# Patient Record
(demographics unavailable — no encounter records)

---

## 2024-12-12 NOTE — PHYSICAL EXAM
[Well Developed] : well developed [NAD] : in no acute distress [Moist & Pink Mucous Membranes] : moist and pink mucous membranes [CTAB] : lungs clear to auscultation bilaterally [Regular Rate and Rhythm] : regular rate and rhythm [Normal S1, S2] : normal S1 and S2 [Soft] : soft  [Distended] : distended [Normal Bowel Sounds] : normal bowel sounds [Stool Palpable] : stool palpable [No HSM] : no hepatosplenomegaly appreciated [Normal Tone] : normal tone [Well-Perfused] : well-perfused [Interactive] : interactive [icteric] : anicteric [Respiratory Distress] : no respiratory distress  [Tender] : non tender [Edema] : no edema [Cyanosis] : no cyanosis [Rash] : no rash [Jaundice] : no jaundice [de-identified] : mildly tender to palpation in RLQ and LLQ

## 2024-12-12 NOTE — ASSESSMENT
[Remission] : Remission [FreeTextEntry5] : PUCAI 0 [FreeTextEntry1] : Sharon is being treated for Ulcerative Colitis, and has had improvement on infliximab. Her weight and appetite have improved recently. To reassess her bowel inflammation, I asked for her to send stool calprotectin, that previously showed persistent elevated inflammation. The following was recommended: - continue infliximab to 600 mg every 4-6 weeks, may need further adjustments - send stool calprotectin - return to office for follow up in 3-4 months - Family should call sooner if clinically indicated.  Mom was reassured and satisfied with the plan.

## 2024-12-12 NOTE — HISTORY OF PRESENT ILLNESS
[Ulcerative Colitis] : Ulcerative Colitis [Colon] : colon [No Pain] : no pain =  score of  ( 0 ) [None] : none =  score of  ( 0 ) [Formed] : formed = score of ( 0 ) [0 - 2] : 0 - 2 stools = score of ( 0 ) [No] : no =  score of  ( 0 ) [No Limitation of Activity] : no limitation of activity =  score of  ( 0 )  [Perianal Disease] : The patient does not have perianal disease. 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 Transverse colon to cecum with loss of normal vasculature and erythema. Normal terminal ileum.  charles 3 colitis rectum to descending, charles 2 colitis transverse to cecum.pancolitis, normal TI, path: pancolonic active colitis with foci of cryptitis, focal mucin depletion with reactive epithelial changes, and  lamina propria plasmacytosis with scattered neutrophils, normal TI

## 2024-12-12 NOTE — CONSULT LETTER
[Dear  ___] : Dear  [unfilled], [Consult Letter:] : I had the pleasure of evaluating your patient, [unfilled]. [Please see my note below.] : Please see my note below. [Consult Closing:] : Thank you very much for allowing me to participate in the care of this patient.  If you have any questions, please do not hesitate to contact me. [Sincerely,] : Sincerely, [FreeTextEntry3] : Pal Peña MD MSc  Director, Pediatric Endoscopy Pediatric Gastroenterology and Nutrition [facility] [employer] at [facility] and Lou Aguero [facility]  Division of Pediatric Gastroenterology and Nutrition  [address], [address]  [place] [address]  [phone]

## 2025-03-06 NOTE — ASSESSMENT
[Remission] : Remission [FreeTextEntry5] : PUCAI 0 [FreeTextEntry1] : Sharon is being treated for Ulcerative Colitis, and has had improvement on infliximab. Her weight and appetite have improved recently. Stool calprotectin still elevated but could be from rectal inflammation that has persisted as shown on her recent colonoscopy. he following was recommended: - continue infliximab to 600 mg every 4-6 weeks, may need further adjustments - send stool calprotectin periodically - return to office for follow up in 3-4 months - Family should call sooner if clinically indicated.  Mom was reassured and satisfied with the plan.

## 2025-03-06 NOTE — HISTORY OF PRESENT ILLNESS
[Ulcerative Colitis] : Ulcerative Colitis [Colon] : colon [No Pain] : no pain =  score of  ( 0 ) [None] : none =  score of  ( 0 ) [Formed] : formed = score of ( 0 ) [0 - 2] : 0 - 2 stools = score of ( 0 ) [No] : no =  score of  ( 0 ) [No Limitation of Activity] : no limitation of activity =  score of  ( 0 )  [Perianal Disease] : The patient does not have perianal disease. [de-identified] : Sept 2023 [de-identified] : Calprotectin: 12/12/24: 628 7/2/24 382 3/8/24 377 8/18/23 1320 [de-identified] : 8/2024 normal abdominal CT [de-identified] : 8/2023 MRE Colonic wall thickening with increased enhancement and restricted diffusion which is more pronounced in the sigmoid and rectum  [de-identified] : 10/28/24 normal endoscopy 9/2023 path active duodenitis, mild esophagitis with scanty eosinophils and mildly increased lymphocytes [de-identified] : 10/28/24 erosive periappencideal area and rectosigmoid colitis (Charles 1), both with chronic inflammation and crypt architectural distortion 9/2023 Mucosa Exudative, edematous and friable mucosa with loss of normal vasculature pattern worse in rectum, sigmoid and descending colon. Transverse colon to cecum with loss of normal vasculature and erythema. Normal terminal ileum.  charles 3 colitis rectum to descending, charles 2 colitis transverse to cecum.pancolitis, normal TI, path: pancolonic active colitis with foci of cryptitis, focal mucin depletion with reactive epithelial changes, and  lamina propria plasmacytosis with scattered neutrophils, normal TI

## 2025-03-06 NOTE — CONSULT LETTER
[Dear  ___] : Dear  [unfilled], [Consult Letter:] : I had the pleasure of evaluating your patient, [unfilled]. [Please see my note below.] : Please see my note below. [Consult Closing:] : Thank you very much for allowing me to participate in the care of this patient.  If you have any questions, please do not hesitate to contact me. [Sincerely,] : Sincerely, [FreeTextEntry3] : Pal Peña MD MSc  Director, Pediatric Endoscopy Pediatric Gastroenterology and Nutrition [facility] [employer] [facility] at [facility] and Lou Aguero [facility]  Division of Pediatric Gastroenterology and Nutrition  [address], [address]  [place] [address]  [phone]

## 2025-03-06 NOTE — CONSULT LETTER
[Dear  ___] : Dear  [unfilled], [Consult Letter:] : I had the pleasure of evaluating your patient, [unfilled]. [Please see my note below.] : Please see my note below. [Consult Closing:] : Thank you very much for allowing me to participate in the care of this patient.  If you have any questions, please do not hesitate to contact me. [Sincerely,] : Sincerely, [FreeTextEntry3] : Pal Peña MD MSc  Director, Pediatric Endoscopy Pediatric Gastroenterology and Nutrition [facility] [employer] [facility] at [facility] and Lou Aguero [facility]  Division of Pediatric Gastroenterology and Nutrition  [address], [address]  [place] [address]  [phone]

## 2025-03-06 NOTE — HISTORY OF PRESENT ILLNESS
[Ulcerative Colitis] : Ulcerative Colitis [Colon] : colon [No Pain] : no pain =  score of  ( 0 ) [None] : none =  score of  ( 0 ) [Formed] : formed = score of ( 0 ) [0 - 2] : 0 - 2 stools = score of ( 0 ) [No] : no =  score of  ( 0 ) [No Limitation of Activity] : no limitation of activity =  score of  ( 0 )  [Perianal Disease] : The patient does not have perianal disease. [de-identified] : Sept 2023 [de-identified] : Calprotectin: 12/12/24: 628 7/2/24 382 3/8/24 377 8/18/23 1320 [de-identified] : 8/2024 normal abdominal CT [de-identified] : 8/2023 MRE Colonic wall thickening with increased enhancement and restricted diffusion which is more pronounced in the sigmoid and rectum  [de-identified] : 10/28/24 normal endoscopy 9/2023 path active duodenitis, mild esophagitis with scanty eosinophils and mildly increased lymphocytes [de-identified] : 10/28/24 erosive periappencideal area and rectosigmoid colitis (Charles 1), both with chronic inflammation and crypt architectural distortion 9/2023 Mucosa Exudative, edematous and friable mucosa with loss of normal vasculature pattern worse in rectum, sigmoid and descending colon. Transverse colon to cecum with loss of normal vasculature and erythema. Normal terminal ileum.  charles 3 colitis rectum to descending, charles 2 colitis transverse to cecum.pancolitis, normal TI, path: pancolonic active colitis with foci of cryptitis, focal mucin depletion with reactive epithelial changes, and  lamina propria plasmacytosis with scattered neutrophils, normal TI

## 2025-03-06 NOTE — PHYSICAL EXAM
[Well Developed] : well developed [NAD] : in no acute distress [Moist & Pink Mucous Membranes] : moist and pink mucous membranes [CTAB] : lungs clear to auscultation bilaterally [Regular Rate and Rhythm] : regular rate and rhythm [Normal S1, S2] : normal S1 and S2 [Soft] : soft  [Normal Bowel Sounds] : normal bowel sounds [No HSM] : no hepatosplenomegaly appreciated [Normal Tone] : normal tone [Well-Perfused] : well-perfused [Interactive] : interactive [de-identified] : mildly tender to palpation in RLQ and LLQ [icteric] : anicteric [Respiratory Distress] : no respiratory distress  [Distended] : non distended [Tender] : non tender [Stool Palpable] : no stool palpable [Edema] : no edema [Cyanosis] : no cyanosis [Rash] : no rash [Jaundice] : no jaundice

## 2025-03-06 NOTE — PHYSICAL EXAM
[Well Developed] : well developed [NAD] : in no acute distress [Moist & Pink Mucous Membranes] : moist and pink mucous membranes [CTAB] : lungs clear to auscultation bilaterally [Regular Rate and Rhythm] : regular rate and rhythm [Normal S1, S2] : normal S1 and S2 [Soft] : soft  [Normal Bowel Sounds] : normal bowel sounds [No HSM] : no hepatosplenomegaly appreciated [Normal Tone] : normal tone [Well-Perfused] : well-perfused [Interactive] : interactive [de-identified] : mildly tender to palpation in RLQ and LLQ [icteric] : anicteric [Respiratory Distress] : no respiratory distress  [Distended] : non distended [Tender] : non tender [Stool Palpable] : no stool palpable [Edema] : no edema [Cyanosis] : no cyanosis [Rash] : no rash [Jaundice] : no jaundice